# Patient Record
Sex: FEMALE | Employment: UNEMPLOYED | URBAN - METROPOLITAN AREA
[De-identification: names, ages, dates, MRNs, and addresses within clinical notes are randomized per-mention and may not be internally consistent; named-entity substitution may affect disease eponyms.]

---

## 2019-04-20 ENCOUNTER — HOSPITAL ENCOUNTER (EMERGENCY)
Facility: CLINIC | Age: 24
Discharge: PSYCHIATRIC OR SUBSTANCE USE FACILITY | End: 2019-04-20
Attending: EMERGENCY MEDICINE | Admitting: EMERGENCY MEDICINE

## 2019-04-20 ENCOUNTER — HOSPITAL ENCOUNTER (INPATIENT)
Facility: CLINIC | Age: 24
LOS: 2 days | Discharge: HOME | DRG: 885 | End: 2019-04-22
Attending: PSYCHIATRY & NEUROLOGY | Admitting: PSYCHIATRY & NEUROLOGY

## 2019-04-20 ENCOUNTER — TELEPHONE (OUTPATIENT)
Dept: BEHAVIORAL HEALTH | Facility: CLINIC | Age: 24
End: 2019-04-20

## 2019-04-20 VITALS
RESPIRATION RATE: 16 BRPM | TEMPERATURE: 98.8 F | HEART RATE: 74 BPM | SYSTOLIC BLOOD PRESSURE: 102 MMHG | DIASTOLIC BLOOD PRESSURE: 58 MMHG | OXYGEN SATURATION: 99 %

## 2019-04-20 DIAGNOSIS — F33.2 SEVERE EPISODE OF RECURRENT MAJOR DEPRESSIVE DISORDER, WITHOUT PSYCHOTIC FEATURES (H): ICD-10-CM

## 2019-04-20 DIAGNOSIS — F33.1 MODERATE EPISODE OF RECURRENT MAJOR DEPRESSIVE DISORDER (H): Primary | ICD-10-CM

## 2019-04-20 DIAGNOSIS — R45.851 SUICIDAL IDEATION: ICD-10-CM

## 2019-04-20 DIAGNOSIS — S61.512A LACERATION OF LEFT WRIST, INITIAL ENCOUNTER: ICD-10-CM

## 2019-04-20 DIAGNOSIS — Z72.89 DELIBERATE SELF-CUTTING: ICD-10-CM

## 2019-04-20 PROBLEM — R45.89 SUICIDAL BEHAVIOR: Status: ACTIVE | Noted: 2019-04-20

## 2019-04-20 LAB
AMPHETAMINES UR QL SCN: NEGATIVE
B-HCG FREE SERPL-ACNC: <5 IU/L
BARBITURATES UR QL: NEGATIVE
BENZODIAZ UR QL: NEGATIVE
CANNABINOIDS UR QL SCN: NEGATIVE
COCAINE UR QL: NEGATIVE
ETHANOL SERPL-MCNC: 0.23 G/DL
OPIATES UR QL SCN: NEGATIVE
PCP UR QL SCN: NEGATIVE

## 2019-04-20 PROCEDURE — 25000132 ZZH RX MED GY IP 250 OP 250 PS 637: Performed by: EMERGENCY MEDICINE

## 2019-04-20 PROCEDURE — 12032 INTMD RPR S/A/T/EXT 2.6-7.5: CPT

## 2019-04-20 PROCEDURE — 90791 PSYCH DIAGNOSTIC EVALUATION: CPT

## 2019-04-20 PROCEDURE — 25000132 ZZH RX MED GY IP 250 OP 250 PS 637: Performed by: PSYCHIATRY & NEUROLOGY

## 2019-04-20 PROCEDURE — 84702 CHORIONIC GONADOTROPIN TEST: CPT

## 2019-04-20 PROCEDURE — 80307 DRUG TEST PRSMV CHEM ANLYZR: CPT | Performed by: EMERGENCY MEDICINE

## 2019-04-20 PROCEDURE — 80320 DRUG SCREEN QUANTALCOHOLS: CPT | Performed by: EMERGENCY MEDICINE

## 2019-04-20 PROCEDURE — 12400001 ZZH R&B MH UMMC

## 2019-04-20 RX ORDER — BISACODYL 10 MG
10 SUPPOSITORY, RECTAL RECTAL DAILY PRN
Status: DISCONTINUED | OUTPATIENT
Start: 2019-04-20 | End: 2019-04-22 | Stop reason: HOSPADM

## 2019-04-20 RX ORDER — OLANZAPINE 5 MG/1
5-10 TABLET ORAL
Status: DISCONTINUED | OUTPATIENT
Start: 2019-04-20 | End: 2019-04-22 | Stop reason: HOSPADM

## 2019-04-20 RX ORDER — TRAZODONE HYDROCHLORIDE 50 MG/1
50 TABLET, FILM COATED ORAL
Status: DISCONTINUED | OUTPATIENT
Start: 2019-04-20 | End: 2019-04-22 | Stop reason: HOSPADM

## 2019-04-20 RX ORDER — LIDOCAINE HYDROCHLORIDE AND EPINEPHRINE 10; 10 MG/ML; UG/ML
INJECTION, SOLUTION INFILTRATION; PERINEURAL
Status: DISCONTINUED
Start: 2019-04-20 | End: 2019-04-20 | Stop reason: HOSPADM

## 2019-04-20 RX ORDER — HYDROXYZINE HYDROCHLORIDE 25 MG/1
25 TABLET, FILM COATED ORAL EVERY 4 HOURS PRN
Status: DISCONTINUED | OUTPATIENT
Start: 2019-04-20 | End: 2019-04-22 | Stop reason: HOSPADM

## 2019-04-20 RX ORDER — ACETAMINOPHEN 325 MG/1
650 TABLET ORAL EVERY 4 HOURS PRN
Status: DISCONTINUED | OUTPATIENT
Start: 2019-04-20 | End: 2019-04-22 | Stop reason: HOSPADM

## 2019-04-20 RX ORDER — IBUPROFEN 600 MG/1
600 TABLET, FILM COATED ORAL ONCE
Status: COMPLETED | OUTPATIENT
Start: 2019-04-20 | End: 2019-04-20

## 2019-04-20 RX ORDER — ALUMINA, MAGNESIA, AND SIMETHICONE 2400; 2400; 240 MG/30ML; MG/30ML; MG/30ML
30 SUSPENSION ORAL EVERY 4 HOURS PRN
Status: DISCONTINUED | OUTPATIENT
Start: 2019-04-20 | End: 2019-04-22 | Stop reason: HOSPADM

## 2019-04-20 RX ORDER — OLANZAPINE 10 MG/2ML
5-10 INJECTION, POWDER, FOR SOLUTION INTRAMUSCULAR
Status: DISCONTINUED | OUTPATIENT
Start: 2019-04-20 | End: 2019-04-22 | Stop reason: HOSPADM

## 2019-04-20 RX ORDER — ACETAMINOPHEN 325 MG/1
650 TABLET ORAL ONCE
Status: COMPLETED | OUTPATIENT
Start: 2019-04-20 | End: 2019-04-20

## 2019-04-20 RX ADMIN — ACETAMINOPHEN 650 MG: 325 TABLET, FILM COATED ORAL at 12:55

## 2019-04-20 RX ADMIN — ACETAMINOPHEN 650 MG: 325 TABLET, FILM COATED ORAL at 22:45

## 2019-04-20 RX ADMIN — IBUPROFEN 600 MG: 600 TABLET ORAL at 12:55

## 2019-04-20 RX ADMIN — TRAZODONE HYDROCHLORIDE 50 MG: 50 TABLET ORAL at 22:42

## 2019-04-20 ASSESSMENT — ACTIVITIES OF DAILY LIVING (ADL)
SWALLOWING: 0-->SWALLOWS FOODS/LIQUIDS WITHOUT DIFFICULTY
ORAL_HYGIENE: INDEPENDENT
TOILETING: 0-->INDEPENDENT
TRANSFERRING: 0-->INDEPENDENT
AMBULATION: 0-->INDEPENDENT
BATHING: 0-->INDEPENDENT
HYGIENE/GROOMING: SHOWER
RETIRED_COMMUNICATION: 0-->UNDERSTANDS/COMMUNICATES WITHOUT DIFFICULTY
DRESS: 0-->INDEPENDENT
FALL_HISTORY_WITHIN_LAST_SIX_MONTHS: NO
COGNITION: 0 - NO COGNITION ISSUES REPORTED
RETIRED_EATING: 0-->INDEPENDENT
DRESS: SCRUBS (BEHAVIORAL HEALTH)

## 2019-04-20 ASSESSMENT — ENCOUNTER SYMPTOMS: WOUND: 1

## 2019-04-20 ASSESSMENT — MIFFLIN-ST. JEOR: SCORE: 1415

## 2019-04-20 NOTE — ED PROVIDER NOTES
[facility] ED Behavioral Health Handoff Note:       Brief HPI:  This is a 23 year old female signed out to me by Dr. South .  See initial ED Provider note for details of the presentation.     Patient is medically cleared for admission to a Behavioral Health unit.      Pending studies include  NONE.      The patient is on a hold.  The type of hold is PERCY.          The patient has not required recent medication for agitation.      Exam:   Temp:  [97.7  F (36.5  C)] 97.7  F (36.5  C)  Pulse:  [] 74  Resp:  [20] 20  BP: (102-134)/(58-98) 102/58  SpO2:  [97 %-99 %] 97 %      ED Course:    There were no significant events while under my care.            Impression:    ICD-10-CM    1. Severe episode of recurrent major depressive disorder, without psychotic features (H) F33.2 Drug abuse screen 77 urine     Alcohol ethyl     ISTAT HCG Quantitative Pregnancy POCT     ISTAT HCG Quantitative Pregnancy POCT   2. Laceration of left wrist, initial encounter S61.512A    3. Deliberate self-cutting Z72.89    4. Suicidal ideation R45.851        Plan:    1. Transfer to Mental Health Facility      RESULTS:   Results for orders placed or performed during the hospital encounter of 04/20/19 (from the past 24 hour(s))   Alcohol ethyl     Status: Abnormal    Collection Time: 04/20/19  4:42 AM   Result Value Ref Range    Ethanol g/dL 0.23 (H) <0.01 g/dL   ISTAT HCG Quantitative Pregnancy POCT     Status: None    Collection Time: 04/20/19  5:06 AM   Result Value Ref Range    HCG Quantitative Serum <5.0 <5.0 IU/L   Drug abuse screen 77 urine     Status: None    Collection Time: 04/20/19  5:56 AM   Result Value Ref Range    Amphetamine Qual Urine Negative NEG^Negative    Barbiturates Qual Urine Negative NEG^Negative    Benzodiazepine Qual Urine Negative NEG^Negative    Cannabinoids Qual Urine Negative NEG^Negative    Cocaine Qual Urine Negative NEG^Negative    Opiates Qualitative Urine Negative NEG^Negative    PCP Qual Urine Negative NEG^Negative              Sushil Snider MD  04/20/19 7242
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
## 2019-04-20 NOTE — ED PROVIDER NOTES
[facility] ED Behavioral Health Handoff Note:       Brief HPI:  This is a 23 year old female signed out to me by Dr. Beach .  See initial ED Provider note for details of the presentation.     Nena Paez is a 23 year old female who presents with suicidal ideations and a significant wrist laceration. The patient states she is having problems with a relationship as she found a text on her boyfriend's phone causing her to cut her left wrist.  She states that she was cutting tonight because she wants to kill herself. The sister states the patient did something similar when she was 13. The patient notes she was drinking tonight. The patient is not taking any medication. She denies any recreational drug use.      Patient is medically cleared for admission to a Behavioral Health unit.      Pending studies include UDS.      The patient is on a hold.  The type of hold is PERCY.      The patient has not required medication for agitation.      Exam:   Patient Vitals for the past 24 hrs:   BP Temp Temp src Pulse Resp SpO2   04/20/19 0700 114/66 -- -- 109 -- 97 %   04/20/19 0431 (!) 134/98 97.7  F (36.5  C) Oral 109 20 99 %     General: Resting comfortably on the gurney  Head:  The scalp, face, and head appear normal  Eyes:  The pupils are normal    Conjunctivae and sclera appear normal  ENT:    The nose is normal    Ears/pinnae are normal  Neck:  Normal range of motion  MS:  No extremity deformities.    Skin:  No rash or lesions noted.Laceration repaired  Neuro: Mohawk speaking.  Speech is normal and fluent  Psych:  Awake. Alert.  Flat affect.  Depressed mood.    Appropriate interactions    ED Course:    There were significant events while under my care.      DEC evaluated the patient and felt the patient was actively suicidal.  Patient apparently had said something last night her boyfriend and ultimately decided to kill herself leading to left wrist laceration.  Laceration has been repaired.  Of note patient's history has been  somewhat complicated.  2 years ago she had a prior admission for alcohol and suicidal thoughts.  Additionally she had a prior suicide attempt 10 years ago.  She continues to have racing thoughts today but is not actively homicidal.  Due to concerning findings above and after DEC assessment, patient would benefit from inpatient psychiatry.  We are working on a young adult bed at this time and are awaiting bed placement    Patient was signed out to the oncoming provider. Dr. Anna      Impression:    ICD-10-CM    1. Severe episode of recurrent major depressive disorder, without psychotic features (H) F33.2 Drug abuse screen 77 urine     Alcohol ethyl     ISTAT HCG Quantitative Pregnancy POCT     ISTAT HCG Quantitative Pregnancy POCT   2. Laceration of left wrist, initial encounter S61.512A    3. Deliberate self-cutting Z72.89    4. Suicidal ideation R45.851        Plan:    1. Await Transfer to Mental Health Facility      RESULTS:   Results for orders placed or performed during the hospital encounter of 04/20/19 (from the past 24 hour(s))   Alcohol ethyl     Status: Abnormal    Collection Time: 04/20/19  4:42 AM   Result Value Ref Range    Ethanol g/dL 0.23 (H) <0.01 g/dL   ISTAT HCG Quantitative Pregnancy POCT     Status: None    Collection Time: 04/20/19  5:06 AM   Result Value Ref Range    HCG Quantitative Serum <5.0 <5.0 IU/L   Drug abuse screen 77 urine     Status: None    Collection Time: 04/20/19  5:56 AM   Result Value Ref Range    Amphetamine Qual Urine Negative NEG^Negative    Barbiturates Qual Urine Negative NEG^Negative    Benzodiazepine Qual Urine Negative NEG^Negative    Cannabinoids Qual Urine Negative NEG^Negative    Cocaine Qual Urine Negative NEG^Negative    Opiates Qualitative Urine Negative NEG^Negative    PCP Qual Urine Negative NEG^Negative             Simon Kerr MD  04/20/19 4669
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
## 2019-04-20 NOTE — ED NOTES
"Attempted to call report, but writer was notified that accepting RN was \"in the middle of something\" and would be for 2-3 hours. Awaiting call back to give report.   "

## 2019-04-20 NOTE — ED TRIAGE NOTES
Pt to ER with c/o suicidal attempt by cutting her left wrist, pt was jealous over a text on her boyfriends phone and she cut her wrist, pt has been drinking tonight as well

## 2019-04-21 ENCOUNTER — OFFICE VISIT (OUTPATIENT)
Dept: INTERPRETER SERVICES | Facility: CLINIC | Age: 24
End: 2019-04-21

## 2019-04-21 PROBLEM — F32.9 MDD (MAJOR DEPRESSIVE DISORDER): Status: ACTIVE | Noted: 2019-04-21

## 2019-04-21 LAB
ALBUMIN SERPL-MCNC: 3.7 G/DL (ref 3.4–5)
ALP SERPL-CCNC: 55 U/L (ref 40–150)
ALT SERPL W P-5'-P-CCNC: 24 U/L (ref 0–50)
ANION GAP SERPL CALCULATED.3IONS-SCNC: 7 MMOL/L (ref 3–14)
AST SERPL W P-5'-P-CCNC: 25 U/L (ref 0–45)
BASOPHILS # BLD AUTO: 0 10E9/L (ref 0–0.2)
BASOPHILS NFR BLD AUTO: 0.2 %
BILIRUB SERPL-MCNC: 1.6 MG/DL (ref 0.2–1.3)
BUN SERPL-MCNC: 18 MG/DL (ref 7–30)
CALCIUM SERPL-MCNC: 8.6 MG/DL (ref 8.5–10.1)
CHLORIDE SERPL-SCNC: 106 MMOL/L (ref 94–109)
CHOLEST SERPL-MCNC: 179 MG/DL
CO2 SERPL-SCNC: 26 MMOL/L (ref 20–32)
CREAT SERPL-MCNC: 0.64 MG/DL (ref 0.52–1.04)
DIFFERENTIAL METHOD BLD: NORMAL
EOSINOPHIL # BLD AUTO: 0.2 10E9/L (ref 0–0.7)
EOSINOPHIL NFR BLD AUTO: 3.5 %
ERYTHROCYTE [DISTWIDTH] IN BLOOD BY AUTOMATED COUNT: 12.8 % (ref 10–15)
GFR SERPL CREATININE-BSD FRML MDRD: >90 ML/MIN/{1.73_M2}
GLUCOSE SERPL-MCNC: 87 MG/DL (ref 70–99)
HCT VFR BLD AUTO: 35 % (ref 35–47)
HDLC SERPL-MCNC: 70 MG/DL
HGB BLD-MCNC: 11.9 G/DL (ref 11.7–15.7)
IMM GRANULOCYTES # BLD: 0 10E9/L (ref 0–0.4)
IMM GRANULOCYTES NFR BLD: 0 %
LDLC SERPL CALC-MCNC: 94 MG/DL
LYMPHOCYTES # BLD AUTO: 2.6 10E9/L (ref 0.8–5.3)
LYMPHOCYTES NFR BLD AUTO: 51.4 %
MCH RBC QN AUTO: 30.5 PG (ref 26.5–33)
MCHC RBC AUTO-ENTMCNC: 34 G/DL (ref 31.5–36.5)
MCV RBC AUTO: 90 FL (ref 78–100)
MONOCYTES # BLD AUTO: 0.3 10E9/L (ref 0–1.3)
MONOCYTES NFR BLD AUTO: 5.3 %
NEUTROPHILS # BLD AUTO: 2 10E9/L (ref 1.6–8.3)
NEUTROPHILS NFR BLD AUTO: 39.6 %
NONHDLC SERPL-MCNC: 109 MG/DL
NRBC # BLD AUTO: 0 10*3/UL
NRBC BLD AUTO-RTO: 0 /100
PLATELET # BLD AUTO: 181 10E9/L (ref 150–450)
PLATELET # BLD EST: NORMAL 10*3/UL
POTASSIUM SERPL-SCNC: 3.6 MMOL/L (ref 3.4–5.3)
PROT SERPL-MCNC: 7 G/DL (ref 6.8–8.8)
RBC # BLD AUTO: 3.9 10E12/L (ref 3.8–5.2)
SODIUM SERPL-SCNC: 139 MMOL/L (ref 133–144)
TRIGL SERPL-MCNC: 74 MG/DL
TSH SERPL DL<=0.005 MIU/L-ACNC: 0.65 MU/L (ref 0.4–4)
WBC # BLD AUTO: 5.1 10E9/L (ref 4–11)

## 2019-04-21 PROCEDURE — 80053 COMPREHEN METABOLIC PANEL: CPT | Performed by: PSYCHIATRY & NEUROLOGY

## 2019-04-21 PROCEDURE — T1013 SIGN LANG/ORAL INTERPRETER: HCPCS | Mod: U3

## 2019-04-21 PROCEDURE — 12400001 ZZH R&B MH UMMC

## 2019-04-21 PROCEDURE — 25000132 ZZH RX MED GY IP 250 OP 250 PS 637: Performed by: PSYCHIATRY & NEUROLOGY

## 2019-04-21 PROCEDURE — 36415 COLL VENOUS BLD VENIPUNCTURE: CPT | Performed by: PSYCHIATRY & NEUROLOGY

## 2019-04-21 PROCEDURE — 80061 LIPID PANEL: CPT | Performed by: PSYCHIATRY & NEUROLOGY

## 2019-04-21 PROCEDURE — 85025 COMPLETE CBC W/AUTO DIFF WBC: CPT | Performed by: PSYCHIATRY & NEUROLOGY

## 2019-04-21 PROCEDURE — 84443 ASSAY THYROID STIM HORMONE: CPT | Performed by: PSYCHIATRY & NEUROLOGY

## 2019-04-21 RX ORDER — FLUOXETINE 10 MG/1
10 CAPSULE ORAL DAILY
Status: DISCONTINUED | OUTPATIENT
Start: 2019-04-21 | End: 2019-04-22 | Stop reason: HOSPADM

## 2019-04-21 RX ADMIN — FLUOXETINE 10 MG: 10 CAPSULE ORAL at 12:22

## 2019-04-21 RX ADMIN — TRAZODONE HYDROCHLORIDE 50 MG: 50 TABLET ORAL at 22:37

## 2019-04-21 RX ADMIN — ACETAMINOPHEN 650 MG: 325 TABLET, FILM COATED ORAL at 21:21

## 2019-04-21 NOTE — PROGRESS NOTES
Pt Turkish speaking, given printed med ed in Persian on prozac and infection control/prevention; Pt takes her first dose prozac 10 mg without incident after she eats her breakfast late and showers, pt able to follow direction to keep left wrist dry, avoiding to get wet in shower. Left wrist clean/dry, sutures intact, skin area surrounding sutures is slightly pink and pt endorsing tenderness, no edema or drainage noted; pt given bacitracin, clean guaze. Pt instructed to keep wrist clean and dry and timely informing nurse if need dry dressing change. Continue to monitor for signs of infection.

## 2019-04-21 NOTE — PROGRESS NOTES
She was pleasant and cooperative with staff requests. She got up and ready when she heard a provider was coming to meet with her, but after that, she spent much of the shift sleeping.      04/21/19 1300   Behavioral Health   Hallucinations denies / not responding to hallucinations   Orientation person: oriented;place: oriented;date: oriented;time: oriented   Memory baseline memory   Insight poor   Judgement impaired   Eye Contact at examiner   Affect blunted, flat   Mood mood is calm   Physical Appearance/Attire attire appropriate to age and situation   Hygiene well groomed   Suicidality other (see comments)  (denies)   1. Wish to be Dead No   2. Non-Specific Active Suicidal Thoughts  No   Self Injury other (see comment)  (no active behavioral concerns)   Elopement   (no concerning statements or behaviors)   Activity isolative;withdrawn   Speech coherent   Medication Sensitivity no observed side effects;no stated side effects   Psychomotor / Gait balanced;steady
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
## 2019-04-21 NOTE — PROGRESS NOTES
04/20/19 2312   Patient Belongings   Did you bring any home meds/supplements to the hospital?  No   Patient Belongings locker   Patient Belongings Put in Hospital Secure Location (Security or Locker, etc.) clothing   Belongings Search Yes     Belongings in Pt Bin:  Shoes, Socks x2, sweatshirt, sweatpants, underwear, bra    Brought in 4/21/19: 5 pr underwear, 3 bras with underwire, 2 pr socks, toiletries, 5 shirts, 3 pr pants.    A               Admission:  I am responsible for any personal items that are not sent to the safe or pharmacy.  [facility] is not responsible for loss, theft or damage of any property in my possession.    Signature:  _________________________________ Date: _______  Time: _____                                              Staff Signature:  ____________________________ Date: ________  Time: _____      2nd Staff person, if patient is unable/unwilling to sign:    Signature: ________________________________ Date: ________  Time: _____     Discharge:  [facility] has returned all of my personal belongings:    Signature: _________________________________ Date: ________  Time: _____                                          Staff Signature:  ____________________________ Date: ________  Time: _____

## 2019-04-21 NOTE — PROGRESS NOTES
"   04/21/19 1800   Behavioral Health   Hallucinations visual   Thinking intact   Orientation person: oriented;place: oriented;date: oriented   Memory baseline memory   Insight poor   Judgement intact   Eye Contact at examiner   Affect full range affect   Mood mood is calm   Physical Appearance/Attire attire appropriate to age and situation   Hygiene well groomed   Suicidality other (see comments)  (Pt denies SI)   1. Wish to be Dead No   2. Non-Specific Active Suicidal Thoughts  No   Self Injury other (see comment)  (Pt denies SIB)   Elopement   (none observed)   Activity withdrawn   Speech clear;coherent   Medication Sensitivity no stated side effects   Psychomotor / Gait balanced;steady     Pt was observed in the milieu eating her dinner and receiving visitors on both shifts.  Pt stated her food has been sufficient and she has been sleeping well.  Pt endorsed VH today in the form of faces flooding her field of vision just before falling asleep (RN notified).  Pt thinks this was caused by the medication she took today, but acknowledges she has experienced this before today when no medication was taken.  Pt states she has been \"quite relaxed\" today and feels happy.  Pt asked about personal hygiene supplies and showering, but had no concerns.  Pt denies SI/SIB.  Pt denied AH.  Pt denied depression and anxiety.  "

## 2019-04-21 NOTE — ED NOTES
Pt advised that unit where being transferred to is a locked unit. Pt given copy of rights, sister interpreted.

## 2019-04-21 NOTE — PLAN OF CARE
"Patient arrived  4/20/2019  8:56 PM to the [facility] and transferred to unit 4A Young Adult Unit on  4/20/2019. Patient expression is Tearful. Patient appears anxious, tearful and is cooperative and pleasant. EPIC listed admitting  DX: mental health  Suicidal behavior    Vital signs reviewed related to admission.  /75   Pulse 78   Temp 98.6  F (37  C) (Tympanic)   Resp 15   Ht 1.626 m (5' 4\")   Wt 67 kg (147 lb 11.3 oz)   SpO2 98%   BMI 25.35 kg/m  . Patient denies  pain.     Patients sister and boyfriend arrived shortly after arrival.    They visited with the patient for approx 30 mins.  After the visit the patient sat down with the RN and  to complete the admission process.  Patient is given a tour and the unit folder is reviewed.      Patient states, the reason for admission is \"I did not pass the test.\"  Patient education is provided on the reason for admission.  Patient reports having thoughts of wishing she is dead \"When I am angry.\" Patient reports feeling hyperactive and racing thoughts.  Stressors included; missing her home.  Patient is from [facility], job, and recent relationship break up. Patient denies any further questions or concerns.     Patient is on a 72 hour hold. Status 15, suicide and self injury precautions.  Will continue to monitor.      15 min checks initiated. Brief orientation to unit provided.     Nursing will continue to monitor.        NURSING TO DO LIST    -CARE PLAN: ENTER ADULT BEHAVIORAL HEALTH PLAN OF CARE  -CARE PLAN: Enter current concern based on priority (BEH template).   -FLOW SHEETS: EDU, Adult PCS, Safety, VS (enter PAIN-CAPA).  -NURSING NAVIGATOR (ADMIT TAB) for required documentation.  -Orders are received.               "
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
## 2019-04-22 ENCOUNTER — OFFICE VISIT (OUTPATIENT)
Dept: INTERPRETER SERVICES | Facility: CLINIC | Age: 24
End: 2019-04-22

## 2019-04-22 VITALS
OXYGEN SATURATION: 98 % | RESPIRATION RATE: 16 BRPM | TEMPERATURE: 96.9 F | DIASTOLIC BLOOD PRESSURE: 67 MMHG | BODY MASS INDEX: 25.22 KG/M2 | WEIGHT: 147.71 LBS | SYSTOLIC BLOOD PRESSURE: 108 MMHG | HEART RATE: 62 BPM | HEIGHT: 64 IN

## 2019-04-22 PROCEDURE — T1013 SIGN LANG/ORAL INTERPRETER: HCPCS | Mod: U3

## 2019-04-22 PROCEDURE — 99239 HOSP IP/OBS DSCHRG MGMT >30: CPT | Performed by: PSYCHIATRY & NEUROLOGY

## 2019-04-22 PROCEDURE — 25000132 ZZH RX MED GY IP 250 OP 250 PS 637: Performed by: PSYCHIATRY & NEUROLOGY

## 2019-04-22 RX ORDER — FLUOXETINE 10 MG/1
10 CAPSULE ORAL DAILY
Qty: 30 CAPSULE | Refills: 0 | Status: SHIPPED | OUTPATIENT
Start: 2019-04-23

## 2019-04-22 RX ADMIN — FLUOXETINE 10 MG: 10 CAPSULE ORAL at 09:18

## 2019-04-22 ASSESSMENT — ACTIVITIES OF DAILY LIVING (ADL)
ORAL_HYGIENE: INDEPENDENT
DRESS: STREET CLOTHES
HYGIENE/GROOMING: SHOWER
LAUNDRY: WITH SUPERVISION

## 2019-04-22 NOTE — DISCHARGE SUMMARY
Psychiatric Discharge Summary    Nena Paez MRN# [id number]   Age: 23 year old YOB: 1995     Date of Admission:  4/20/2019  Date of Discharge:  4/22/2019  Admitting Physician:  Js Cardoza MD  Discharge Physician:  Erlin Jasso MD         Event Leading to Hospitalization:   Ms. Nena aPez, date of birth 1995, is a 23-year-old woman admitted to the [facility] Psychiatry Unit on 04/20/2019.  She was admitted after she had become intoxicated and cut her wrists.  When she sobered up, she was still feeling suicidal and transfer to Psychiatry was arranged.  She had cut her wrist in an attempt to die and this required sutures.  She notes that the trigger was seeing a text on her boyfriend's phone and then becoming intoxicated.  She states that she drank more than she usually does and had a blood alcohol of 0.23.        See Admission note by Js Cardoza MD on 4/21/19 for additional details.          Diagnoses:     1.  Major depressive disorder, recurrent, moderate to severe  2.  Premenstrual dysphoric disorder.   3.  Posttraumatic stress disorder  4.  Suicide attempt via cutting of wrist in setting of intoxication              Labs:     Results for orders placed or performed during the hospital encounter of 04/20/19   CBC with platelets differential   Result Value Ref Range    WBC 5.1 4.0 - 11.0 10e9/L    RBC Count 3.90 3.8 - 5.2 10e12/L    Hemoglobin 11.9 11.7 - 15.7 g/dL    Hematocrit 35.0 35.0 - 47.0 %    MCV 90 78 - 100 fl    MCH 30.5 26.5 - 33.0 pg    MCHC 34.0 31.5 - 36.5 g/dL    RDW 12.8 10.0 - 15.0 %    Platelet Count 181 150 - 450 10e9/L    Diff Method Automated Method     % Neutrophils 39.6 %    % Lymphocytes 51.4 %    % Monocytes 5.3 %    % Eosinophils 3.5 %    % Basophils 0.2 %    % Immature Granulocytes 0.0 %    Nucleated RBCs 0 0 /100    Absolute Neutrophil 2.0 1.6 - 8.3 10e9/L    Absolute Lymphocytes 2.6 0.8 - 5.3 10e9/L    Absolute  "Monocytes 0.3 0.0 - 1.3 10e9/L    Absolute Eosinophils 0.2 0.0 - 0.7 10e9/L    Absolute Basophils 0.0 0.0 - 0.2 10e9/L    Abs Immature Granulocytes 0.0 0 - 0.4 10e9/L    Absolute Nucleated RBC 0.0     Platelet Estimate Normal    Comprehensive metabolic panel   Result Value Ref Range    Sodium 139 133 - 144 mmol/L    Potassium 3.6 3.4 - 5.3 mmol/L    Chloride 106 94 - 109 mmol/L    Carbon Dioxide 26 20 - 32 mmol/L    Anion Gap 7 3 - 14 mmol/L    Glucose 87 70 - 99 mg/dL    Urea Nitrogen 18 7 - 30 mg/dL    Creatinine 0.64 0.52 - 1.04 mg/dL    GFR Estimate >90 >60 mL/min/[1.73_m2]    GFR Estimate If Black >90 >60 mL/min/[1.73_m2]    Calcium 8.6 8.5 - 10.1 mg/dL    Bilirubin Total 1.6 (H) 0.2 - 1.3 mg/dL    Albumin 3.7 3.4 - 5.0 g/dL    Protein Total 7.0 6.8 - 8.8 g/dL    Alkaline Phosphatase 55 40 - 150 U/L    ALT 24 0 - 50 U/L    AST 25 0 - 45 U/L   Lipid panel   Result Value Ref Range    Cholesterol 179 <200 mg/dL    Triglycerides 74 <150 mg/dL    HDL Cholesterol 70 >49 mg/dL    LDL Cholesterol Calculated 94 <100 mg/dL    Non HDL Cholesterol 109 <130 mg/dL   TSH with free T4 reflex and/or T3 as indicated   Result Value Ref Range    TSH 0.65 0.40 - 4.00 mU/L              Consults:   No consultations were requested during this admission         Hospital Course:   Nena Paez was admitted to Station 4A with attending Erlin Jasso MD on a 72 hour mental health hold. The patient was placed under status 15 (15 minute checks) to ensure patient safety.     I met with the patient on 4/22 with  present. The patient denied suicidal thoughts and reported that she was tired and \"bored\" here. She regretted her actions and had told various staff that she over reacted and was glad she was alive. The patient with me was endorsing some mild depressive symptoms, but she was not feeling suicidal. She indicated that she'd feel better at home with her sister. She agreed to follow through with recommended treatment. I " explained that alcohol will decrease inhibitions and will increase her risk of suicide attempts again and that until her depression is well controlled (as determined by her outpatient provider) I advised her to avoid all alcohol. I also discussed risks of increased suicidal thinking associated with the medication and encouraged her to call 911 should she have ANY suicidal thoughts after discharge. The patient understood both of these recommendations, believed she could keep herself safe and follow through with my suggestions, and elected to take on potential risks on her own.    At the time of evaluation, the patient did not meet criteria for a commitment, thus I felt it necessary to drop her hold. Given her request for discharge I assessed her safety. She does have risk factors in the form of social stressors, past suicide attempts, depression, possible PTSD. Her protective factors include support from family and lack of current suicidal thoughts. She understands that failure to follow through with recommended treatment or using of drugs or alcohol will increase these risks. With this assessment, I did not believe there was enough evidence of imminent dangerousness that would warrant further involuntary hospitalization. Her request for discharge was granted.    Nena Paez was released to home. At the time of discharge Nena Paez was determined to not be a danger to herself or others.          Discharge Medications:     Current Discharge Medication List      START taking these medications    Details   FLUoxetine (PROZAC) 10 MG capsule Take 1 capsule (10 mg) by mouth daily  Qty: 30 capsule, Refills: 0    Associated Diagnoses: Moderate episode of recurrent major depressive disorder (H)                  Psychiatric Examination:   Appearance:  awake, alert, adequately groomed and casually dressed  Attitude:  cooperative  Eye Contact:  good  Mood:  sad   Affect:  intensity is blunted  Speech:  clear, coherent and  normal prosody  Psychomotor Behavior:  no evidence of tardive dyskinesia, dystonia, or tics and intact station, gait and muscle tone  Thought Process:  logical, linear and goal oriented  Associations:  no loose associations  Thought Content:  no evidence of suicidal ideation or homicidal ideation and no evidence of psychotic thought  Insight:  fair  Judgment:  fair  Oriented to:  time, person, and place  Attention Span and Concentration:  intact  Recent and Remote Memory:  intact  Language: Able to name objects and Able to repeat phrases  Fund of Knowledge: appropriate  Muscle Strength and Tone: normal  Gait and Station: Normal         Discharge Plan:   The patient does not have health insurance, and thus we were unable to schedule her appointments. She was provided with information regarding the [facility] Walk-in Servies as well as other low cost options. She reported to working full time and was strongly advised to get health insurance to allow better access to resources in the future.    The patient will be discharged home with sister, who will  the patient at the hospital. The patient lives with her sister normally.    Attestation:  The patient has been seen and evaluated by me,  Erlin Jasso MD  On the day of discharge, I saw the patient and performed the above examination, reviewed discharge medications, reviewed follow-up plan, and assessed safety for discharge. I spent greater than 30 minutes on these tasks.

## 2019-04-22 NOTE — PROGRESS NOTES
Discharge order is received. Along with , reviewed and discussed discharge instructions, follow-up care, future appointments and medication administration. Patient denies thoughts of SI, SIB or hallucinations. Patient verbalized understanding of discharge instructions and received personal belongings. All forms are signed. Patient to discharge to home. Sister, Kymberly, picked her up.

## 2019-04-22 NOTE — DISCHARGE INSTRUCTIONS
Behavioral Discharge Planning and Instructions      Summary:  You were admitted on 4/20/2019  due to Depression and Self Injurious Behaviors.  You were treated by Dr. Erlin Jasso MD and discharged on 04/22/2019 from Station 4A to Home      Principal Diagnosis:   1.  Major depression.   2.  Premenstrual dysphoric disorder.   3.  Posttraumatic stress disorder        Health Care Follow-up Appointments:   Due to no insurance, no follow up appointments have been made. Below are some free or low cost mental health resources.     [facility] Walk-in Service:  [facility]   [address] [place] [address]   [phone]     Low Cost Mental Health Services:   [facility]   [place]: [address] [place] [address], [address] - [phone]  [place]: [address] [facility] - [phone]  Outpatient mental health services for people covered by [place] Health Care Programs or private insurance. People without insurance may be eligible to receive services on a sliding fee schedule.     [facility]  [phone] [address]  Outpatient mental health services for people covered by [place] Health [place] Programs or private insurance. People without insurance may be eligible to receive services on a sliding fee schedule.     [facility]  [phone] [address] [place] [address]    [facility]  [phone]  [address] [facility]  Mental health counseling, in-home skill building.    [facility] [place] [facility] [place] ([facility] [place])  752-131-3311  8-[facility]  [address], [address]  [place] [address]  Classes and support groups are offered free of charge. Please check the website for up-to-date information on parent resource groups, classes and workshops.     Walk-In Counseling Centers:   Hours & Locations  [phone]    [facility]  [facility] [place]  "[id number]  M, W, F: 1:00PM - 3:00PM  M - Th: 6:30PM - 8:30PM     [facility]  [address] [place] [address]  M, W: 5:00PM - 7:00PM    [facility] [place] [address]  T, Th: 6:00PM - 8:00PM    Major Treatments, Procedures and Findings:  You were provided with: a psychiatric assessment, assessed for medical stability, medication evaluation and/or management, group therapy and milieu management    Symptoms to Report: feeling more aggressive, increased confusion, losing more sleep, mood getting worse or thoughts of suicide    Early warning signs can include: increased depression or anxiety sleep disturbances increased thoughts or behaviors of suicide or self-harm  increased unusual thinking, such as paranoia or hearing voices    Safety and Wellness:  Take all medicines as directed.  Make no changes unless your doctor suggests them.      Follow treatment recommendations.  Refrain from alcohol and non-prescribed drugs.  If there is a concern for safety, call 911.    Resources:   Crisis Intervention: 714.798.9778 or 854-939-8598 (TTY: 745.666.1721).  Call anytime for help.  [place] [employer] [place] (www.[place].damian.org): 713.738.5968 or 873-228-2176.  National Suicide Prevention Line (www.mentalhealthmn.org): 826-699-DMKA (2151)  [facility] Crisis Response 175-069-9648  Text 4 Life: txt \"LIFE\" to 74638 for immediate support and crisis intervention  Crisis text line: Text \"[place]\" to 114325. Free, confidential, 24/7.  Crisis Intervention: 897.628.7433 or 385-564-1213. Call anytime for help.         The treatment team has appreciated the opportunity to work with you.     If you have any questions or concerns our unit number is [phone]        "

## 2019-04-22 NOTE — PLAN OF CARE
BEHAVIORAL TEAM DISCUSSION    Participants: 4A Provider: Dr. Erlin Jasso MD; 4A RN's: Jacklyn Mejia, RN; 4A CTC's: Melissa Shay ([facility]).  Progress: Continuing to Assess .  Continued Stay Criteria/Rationale: New Patient   Medical/Physical: Deferred (see medical notes).  Precautions:    Behavioral Orders   Procedures    Code 1 - Restrict to Unit    Routine Programming     As clinically indicated    Self Injury Precaution    Status 15     Every 15 minutes.    Suicide precautions     Patients on Suicide Precautions should have a Combination Diet ordered that includes a Diet selection(s) AND a Behavioral Tray selection for Safe Tray - with utensils, or Safe Tray - NO utensils       Plan: CTC will plan and coordinate disposition and aftercare planning.  The following services will be provided to the patient; psychiatric assessment, medication management, therapeutic milieu, individual and group support, art therapy, and skills/OT groups.   Rationale for change in precautions or plan: No Change.

## 2019-04-22 NOTE — PLAN OF CARE
"Patient alert and oriented to person, place, time and situation. Talked about being remorseful for actions to cut wrist when she was intoxicated. Stated mood is tired, depressed, but \"better\". Affect congruent, withdrawn. Met with her multiple time using . Left wrist appears to be healing with no signs of infection. She was instructed that sutures typically are removed 7-10 days after placement. Patient has orders to discharge and is agreeable to plan to discharge. She denied SI or self harm ideation. Reviewed all discharge instructions including medication, follow-up plan and appointments with patient, along with . She was given resources for walk-in clinics for primary care and mental health and also medication information printed in [place]. Sister to  at 1700 and  scheduled for 1630 to help accommodate this. Patient verbalized understanding. She will need belongings checked and medications signed and reviewed prior to discharge.     "

## 2019-04-22 NOTE — PLAN OF CARE
The patient specific goals include:   Patient will participate in unit programming  Patient will identify triggers and positive coping skills  Patient will take medications as prescribed by physician both for mental health and medical  Patient coached to work on coping packet    The patient identified the following reasons for hospitalization:  Suicidal Ideations  Self-Injurious Behaviors    The patient identified the following goals for discharge:    Absence of Suicidal Ideations    Learn new coping skills

## (undated) RX ORDER — LIDOCAINE HYDROCHLORIDE AND EPINEPHRINE 10; 10 MG/ML; UG/ML
INJECTION, SOLUTION INFILTRATION; PERINEURAL
Status: DISPENSED
Start: 2019-04-20